# Patient Record
Sex: FEMALE | Race: WHITE
[De-identification: names, ages, dates, MRNs, and addresses within clinical notes are randomized per-mention and may not be internally consistent; named-entity substitution may affect disease eponyms.]

---

## 2017-04-19 NOTE — CPEKG
Heart Rate: 66

RR Interval: 909

P-R Interval: 148

QRSD Interval: 96

QT Interval: 396

QTC Interval: 415

P Axis: 50

QRS Axis: 66

T Wave Axis: 38

EKG Severity - NORMAL ECG -

EKG Impression: SINUS RHYTHM

Electronically Signed By: Phu Salgado 20-Apr-2017 10:12:52

## 2017-05-02 ENCOUNTER — HOSPITAL ENCOUNTER (OUTPATIENT)
Dept: HOSPITAL 80 - FSGY | Age: 62
Discharge: HOME | End: 2017-05-02
Attending: INTERNAL MEDICINE
Payer: MEDICAID

## 2017-05-02 DIAGNOSIS — K29.70: ICD-10-CM

## 2017-05-02 DIAGNOSIS — K21.9: ICD-10-CM

## 2017-05-02 DIAGNOSIS — K20.9: Primary | ICD-10-CM

## 2017-05-02 PROCEDURE — 0DB98ZX EXCISION OF DUODENUM, VIA NATURAL OR ARTIFICIAL OPENING ENDOSCOPIC, DIAGNOSTIC: ICD-10-PCS | Performed by: INTERNAL MEDICINE

## 2017-05-02 PROCEDURE — 0DB68ZX EXCISION OF STOMACH, VIA NATURAL OR ARTIFICIAL OPENING ENDOSCOPIC, DIAGNOSTIC: ICD-10-PCS | Performed by: INTERNAL MEDICINE

## 2017-05-02 NOTE — GPN
[f rep st]



                                                                 PROCEDURE NOTE





PREPROCEDURE DIAGNOSIS:  Heartburn.



POSTPROCEDURE DIAGNOSIS:  Esophagitis and gastritis.



PROCEDURE:  EGD with biopsies.



MEDICATIONS:  Monitored anesthesia care.



INDICATIONS:  The patient is a 61-year-old female with a history of longstanding heartburn and reflu
x who takes baking soda for treatment of her heartburn symptoms.  She also has oropharyngeal dysphag
ia and intermittently chokes on liquids.  She is here for upper endoscopy.  The risks and the benefi
ts of the procedure were discussed with the patient.  Consent obtained.  Risks include, but not limi
umesh to, bleeding, perforation, sedation.  The patient is ASA class 2.



DESCRIPTION OF PROCEDURE:  The end-viewing endoscope was inserted into the esophagus, into the stoma
ch, and the second portion of the duodenum.  The esophagus shows esophagitis at 35 cm from the incis
ors, which is the location of the GE junction.  She had a small ulceration, which is clean-based, at
 the GE junction.  The Z-line is regular.  Otherwise, no evidence of Tamez's.  The stomach shows m
ild diffuse gastritis, worse in the gastric antrum.  Biopsies were taken using cold biopsy forceps t
o evaluate for Helicobacter pylori.  The duodenum and second portion is normal.  Biopsies were taken
 with cold biopsy forceps.



IMPRESSION:  

1.  LA grade B esophagitis at the GE junction consistent with reflux.

2.  Gastritis status post biopsies for Helicobacter pylori.

3.  Normal duodenum status post biopsies.



RECOMMENDATIONS:  

1.  Discharge home with escort.

2.  Advance diet as tolerated.

3.  Continue current medications.

4.  Avoid nonsteroidal anti-inflammatory medications.

5.  Recommend starting Zantac 150 mg orally daily for heartburn.  She wishes to avoid proton pump in
hibitors due to her concerns about the risk associated with Alzheimer's.

6.  Follow up the final biopsy results.  Results available within 10 days. 



Thank you for allowing me to participate in the care of your patient.  Please do not hesitate to ladan delacruz with questions.





Job #:  [id number]

## 2017-06-22 ENCOUNTER — HOSPITAL ENCOUNTER (OUTPATIENT)
Dept: HOSPITAL 80 - FLAB | Age: 62
End: 2017-06-22
Attending: PHYSICIAN ASSISTANT
Payer: MEDICAID

## 2017-06-22 DIAGNOSIS — M85.68: ICD-10-CM

## 2017-06-22 DIAGNOSIS — M16.12: Primary | ICD-10-CM

## 2017-06-27 ENCOUNTER — HOSPITAL ENCOUNTER (OUTPATIENT)
Dept: HOSPITAL 80 - FIMAGING | Age: 62
End: 2017-06-27
Attending: FAMILY MEDICINE
Payer: MEDICAID

## 2017-06-27 DIAGNOSIS — M17.0: ICD-10-CM

## 2017-06-27 DIAGNOSIS — M16.0: Primary | ICD-10-CM

## 2017-08-06 ENCOUNTER — HOSPITAL ENCOUNTER (OUTPATIENT)
Dept: HOSPITAL 80 - FIMAGING | Age: 62
End: 2017-08-06
Attending: FAMILY MEDICINE
Payer: MEDICAID

## 2017-08-06 DIAGNOSIS — M76.01: ICD-10-CM

## 2017-08-06 DIAGNOSIS — M16.2: Primary | ICD-10-CM

## 2017-08-06 DIAGNOSIS — M24.10: ICD-10-CM

## 2017-08-06 DIAGNOSIS — M76.02: ICD-10-CM

## 2018-01-26 ENCOUNTER — HOSPITAL ENCOUNTER (OUTPATIENT)
Dept: HOSPITAL 80 - FIMAGING | Age: 63
End: 2018-01-26
Attending: FAMILY MEDICINE
Payer: MEDICAID

## 2018-01-26 DIAGNOSIS — N64.4: Primary | ICD-10-CM

## 2018-01-26 DIAGNOSIS — N64.52: ICD-10-CM

## 2018-04-11 ENCOUNTER — HOSPITAL ENCOUNTER (OUTPATIENT)
Dept: HOSPITAL 80 - FIMAGING | Age: 63
End: 2018-04-11
Attending: PHYSICIAN ASSISTANT
Payer: MEDICAID

## 2018-04-11 DIAGNOSIS — M85.89: ICD-10-CM

## 2018-04-11 DIAGNOSIS — Z13.820: Primary | ICD-10-CM
